# Patient Record
Sex: MALE | Race: OTHER | URBAN - METROPOLITAN AREA
[De-identification: names, ages, dates, MRNs, and addresses within clinical notes are randomized per-mention and may not be internally consistent; named-entity substitution may affect disease eponyms.]

---

## 2024-11-27 ENCOUNTER — EMERGENCY (EMERGENCY)
Facility: HOSPITAL | Age: 39
LOS: 0 days | Discharge: HOME | End: 2024-11-27
Attending: STUDENT IN AN ORGANIZED HEALTH CARE EDUCATION/TRAINING PROGRAM
Payer: COMMERCIAL

## 2024-11-27 VITALS
RESPIRATION RATE: 16 BRPM | SYSTOLIC BLOOD PRESSURE: 119 MMHG | HEIGHT: 66 IN | TEMPERATURE: 98 F | WEIGHT: 147.71 LBS | HEART RATE: 95 BPM | OXYGEN SATURATION: 100 % | DIASTOLIC BLOOD PRESSURE: 80 MMHG

## 2024-11-27 DIAGNOSIS — M54.2 CERVICALGIA: ICD-10-CM

## 2024-11-27 DIAGNOSIS — M54.50 LOW BACK PAIN, UNSPECIFIED: ICD-10-CM
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
## 2024-11-27 PROCEDURE — 99283 EMERGENCY DEPT VISIT LOW MDM: CPT

## 2024-11-27 RX ORDER — ACETAMINOPHEN 500 MG
975 TABLET ORAL ONCE
Refills: 0 | Status: COMPLETED | OUTPATIENT
Start: 2024-11-27 | End: 2024-11-27

## 2024-11-27 RX ORDER — LIDOCAINE HYDROCHLORIDE 40 MG/ML
1 SOLUTION TOPICAL ONCE
Refills: 0 | Status: COMPLETED | OUTPATIENT
Start: 2024-11-27 | End: 2024-11-27

## 2024-11-27 RX ADMIN — Medication 975 MILLIGRAM(S): at 23:14

## 2024-11-27 RX ADMIN — LIDOCAINE HYDROCHLORIDE 1 PATCH: 40 SOLUTION TOPICAL at 23:14

## 2024-11-27 NOTE — ED PROVIDER NOTE - PATIENT PORTAL LINK FT
You can access the FollowMyHealth Patient Portal offered by [facility] by registering at the following website: [email or web address]. By joining [facility]’s FollowMyHealth portal, you will also be able to view your health information using other applications (apps) compatible with our system.
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
## 2024-11-27 NOTE — ED ADULT NURSE NOTE - BOWEL SOUNDS LLQ
present 50 yo male with left ankle pain. Neurovascularly intact, vitally stable. Will give pain meds and xray.

## 2024-11-27 NOTE — ED ADULT TRIAGE NOTE - BSA (M2)
Discharge instructions reviewed with patient and verbalized understanding, denies further questions and successful teach back occurred. Offered wheelchair for discharge and declined. Discharged ambulatory with steady gait to ED lobby. Written discharge instructions provided to patient. Prescriptions x2 sent electronically to [facility] pharmacy in [place].       Monika Jacinto RN  02/12/23 [id number]
Dr. Pedro Cousin in room to discuss test results and discharge plan of care with patient. Patient reports his headache is gone and he wants to go home without finishing his IV fluids. OK per Dr. Mayela Dick to D/C IV fluids.       Connor Singh RN  02/12/23 2180
1.76

## 2024-11-27 NOTE — ED PROVIDER NOTE - NSFOLLOWUPINSTRUCTIONS_ED_ALL_ED_FT
Please follow up with your primary care physician within the next 1-2 weeks.    You may take Tylenol (1000mg) every 6 hours or Ibuprofen (400mg) every 8 hours for pain control.     You may use over the counter lidocaine patch 4%. Please apply no more than 1 patch every 12 hours.     Please return to the emergency department if you experience any of the following symptoms:    Fever  Chest pain  Difficulty breathing  Abdominal pain  Nausea  Vomiting   Weakness or numbness in arms or legs    Courtney un seguimiento con cabrera médico de atención primaria dentro de las próximas 1 a 2 semanas.    Puede sam Tylenol (1000 mg) cada 6 horas o ibuprofeno (400 mg) cada 8 horas para controlar el dolor.     Puede usar un parche de lidocaína al 4% de venta ashley. No aplique más de 1 parche cada 12 horas.     Regrese al departamento de emergencias si experimenta alguno de los siguientes síntomas:    Fiebre  Dolor en el pecho  dificultad para respirar  dolor abdominal  Náuseas  Vómitos   Debilidad o entumecimiento en brazos o piernas.

## 2024-11-27 NOTE — ED PROVIDER NOTE - PHYSICAL EXAMINATION
General: Appears well and nontoxic  Mentation: AAO x 3  psych: mood appropriate  HEENT: airway patent, conjunctivae clear bilaterally  Resp: symmetric chest rise, no resp distress, breath sounds CTA bilaterally  Cardio: RRR, no m/r/g  GI: soft/nondistended/nontender  Neuro: sensation and motor function grossly intact, 5/5 muscle strength bilaterally in both UEX and AIMEE  Skin: no cyanosis, no jaundice  MSK: normal movement of all extremities, no C-spine, T-spine, L-spine midline tenderness, left trapezius hypertonicity, ambulating with normal gait  Lymph/Vasc: no LE edema
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
